# Patient Record
Sex: MALE | Race: WHITE | NOT HISPANIC OR LATINO | URBAN - METROPOLITAN AREA
[De-identification: names, ages, dates, MRNs, and addresses within clinical notes are randomized per-mention and may not be internally consistent; named-entity substitution may affect disease eponyms.]

---
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
## 2025-01-16 ENCOUNTER — OFFICE VISIT (OUTPATIENT)
Dept: MEDICAL GROUP | Facility: MEDICAL CENTER | Age: 20
End: 2025-01-16
Payer: COMMERCIAL

## 2025-01-16 VITALS
DIASTOLIC BLOOD PRESSURE: 62 MMHG | WEIGHT: 150 LBS | HEART RATE: 107 BPM | TEMPERATURE: 97.3 F | OXYGEN SATURATION: 98 % | SYSTOLIC BLOOD PRESSURE: 124 MMHG | HEIGHT: 72 IN | BODY MASS INDEX: 20.32 KG/M2

## 2025-01-16 DIAGNOSIS — Z13.6 ENCOUNTER FOR LIPID SCREENING FOR CARDIOVASCULAR DISEASE: ICD-10-CM

## 2025-01-16 DIAGNOSIS — Z13.1 SCREENING FOR DIABETES MELLITUS: ICD-10-CM

## 2025-01-16 DIAGNOSIS — Z13.0 SCREENING FOR DEFICIENCY ANEMIA: ICD-10-CM

## 2025-01-16 DIAGNOSIS — R42 LIGHTHEADEDNESS: ICD-10-CM

## 2025-01-16 DIAGNOSIS — R25.1 TREMOR: ICD-10-CM

## 2025-01-16 DIAGNOSIS — Z13.220 ENCOUNTER FOR LIPID SCREENING FOR CARDIOVASCULAR DISEASE: ICD-10-CM

## 2025-01-16 DIAGNOSIS — Z13.29 THYROID DISORDER SCREENING: ICD-10-CM

## 2025-01-16 PROCEDURE — 3078F DIAST BP <80 MM HG: CPT | Performed by: FAMILY MEDICINE

## 2025-01-16 PROCEDURE — 3074F SYST BP LT 130 MM HG: CPT | Performed by: FAMILY MEDICINE

## 2025-01-16 PROCEDURE — 99214 OFFICE O/P EST MOD 30 MIN: CPT | Performed by: FAMILY MEDICINE

## 2025-01-17 NOTE — PROGRESS NOTES
Verbal consent was acquired by the patient to use [employer] ambient listening note generation during this visit:  Yes      Chief complaint::Diagnoses of Tremor, Lightheadedness, Screening for deficiency anemia, Screening for diabetes mellitus, Encounter for lipid screening for cardiovascular disease, and Thyroid disorder screening were pertinent to this visit.    Assessment and Plan:   The following treatment plan was discussed:     Assessment & Plan  1. Tremors -chronic, stable.  More action tremors during fine motor skills  - Advised to try propranolol  - Patient consents to medication regimen  - Reports cold extremity in left pinky; advised to monitor for color changes  - Discussed propranolol's potential circulation issues    2. Lightheadedness - Acute condition  - Discussed vasovagal response  - Ordered orthostatic blood pressures which came back positive as his heart rate did jump up 30 points to maintain his current BP and full blood panel (CBC, complete metabolic panel, GFR, A1c, cholesterol panel, thyroid screening)  - Advised to hydrate before tests    3. Nasal congestion -chronic, stable.  Difficulty in nasal breathing during physical activity  - Managed with occasional saline spray    4. Cold sensation in hands -chronic, stable.  Occasional cold sensations in fingers without discoloration  - Advised to use hand warmers  - Monitor for numbness or significant color changes    Follow-up as needed  Mark was seen today for tremors.    Diagnoses and all orders for this visit:    Tremor    Lightheadedness  -     Orthostatic Blood Pressure    Screening for deficiency anemia  -     CBC WITHOUT DIFFERENTIAL; Future    Screening for diabetes mellitus  -     Comp Metabolic Panel; Future  -     ESTIMATED GFR; Future  -     HEMOGLOBIN A1C; Future    Encounter for lipid screening for cardiovascular disease  -     Lipid Profile; Future  -     Lipoprotein (a); Future    Thyroid disorder screening  -     TSH+FREE  T4        Followup: Return if symptoms worsen or fail to improve.    I have placed Orthostatic BP orders.  The MA is preforming Orthostatic BP orders under the direction of Dr. Newman  Subjective/HPI:     HPI:    Mark Miller is a pleasant 20 y.o. male here for   Chief Complaint   Patient presents with    Tremors        History of Present Illness  The patient is a 20-year-old individual presenting with tremors, cold sensation in their finger, nasal congestion, and lightheadedness.    They have experienced intermittent tremors for several years, worsening over time, especially during fine motor tasks like using tweezers.    They report occasional cold sensation in one finger without discoloration or numbness.    They have nasal congestion, unrelieved by saline spray.    They experience weekly episodes of lightheadedness for the past 1-2 months, occurring while seated, not related to positional changes. Their blood sugar levels are normal. They do not consume excessive caffeine or energy drinks. They request routine blood work.    Current Medicines (including changes today)  Current Outpatient Medications   Medication Sig Dispense Refill    propranolol (INDERAL) 10 MG Tab Take 1 Tablet by mouth 3 times a day as needed (anxiety). 30 Tablet 3    finasteride (PROPECIA) 1 MG tablet Take 1 Tablet by mouth every day. 90 Tablet 3    busPIRone (BUSPAR) 15 MG tablet Take 1 Tablet by mouth 2 times a day. 180 Tablet 3    mirtazapine (REMERON) 15 MG Tab Take 1 Tablet by mouth every evening. 90 Tablet 3    benzoyl peroxide (BENZOYL PEROXIDE WASH) 5 % external liquid Apply 1 Application topically every day. 148 mL 5     No current facility-administered medications for this visit.     Past Medical/ Surgical History  He  has a past medical history of Anxiety and Scoliosis.  He  has a past surgical history that includes dental extraction(s).       Objective:   /62   Pulse (!) 107   Temp 36.3 °C (97.3 °F)   Ht 1.829 m  (6')   Wt 68 kg (150 lb)   SpO2 98%  Body mass index is 20.34 kg/m².    Physical Exam  Constitutional:       General: He is not in acute distress.     Appearance: He is not ill-appearing or toxic-appearing.   HENT:      Head: Normocephalic.      Right Ear: Tympanic membrane and external ear normal.      Left Ear: Tympanic membrane and external ear normal.      Nose: Nose normal. No rhinorrhea.      Mouth/Throat:      Mouth: Mucous membranes are moist.      Pharynx: Oropharynx is clear. No posterior oropharyngeal erythema.   Eyes:      General:         Right eye: No discharge.         Left eye: No discharge.      Conjunctiva/sclera: Conjunctivae normal.      Pupils: Pupils are equal, round, and reactive to light.   Cardiovascular:      Rate and Rhythm: Normal rate and regular rhythm.      Heart sounds: No murmur heard.  Pulmonary:      Effort: Pulmonary effort is normal. No respiratory distress.      Breath sounds: Normal breath sounds. No wheezing.   Abdominal:      General: Abdomen is flat.   Musculoskeletal:         General: No swelling or tenderness.      Cervical back: Normal range of motion and neck supple.      Right lower leg: No edema.      Left lower leg: No edema.   Skin:     General: Skin is warm.   Neurological:      General: No focal deficit present.      Mental Status: He is alert and oriented to person, place, and time. Mental status is at baseline.      Comments: Mild tremors noted to bilateral hands   Psychiatric:         Mood and Affect: Mood normal.          Results  01/16/2025 orthostatic blood pressure: Lying down for 5 minutes /60 heart rate 86, standing 1 minute 118/62 heart rate 110 denied any dizziness, standing for 3 minutes /60 heart rate 115    Please note that this dictation was created using voice recognition software. I have made every reasonable attempt to correct obvious errors, but I expect that there are errors of grammar and possibly content that I did not discover  before finalizing the note.

## 2025-01-27 ENCOUNTER — HOSPITAL ENCOUNTER (OUTPATIENT)
Dept: LAB | Facility: MEDICAL CENTER | Age: 20
End: 2025-01-27
Attending: FAMILY MEDICINE
Payer: COMMERCIAL

## 2025-01-27 DIAGNOSIS — Z13.1 SCREENING FOR DIABETES MELLITUS: ICD-10-CM

## 2025-01-27 DIAGNOSIS — Z13.6 ENCOUNTER FOR LIPID SCREENING FOR CARDIOVASCULAR DISEASE: ICD-10-CM

## 2025-01-27 DIAGNOSIS — Z13.0 SCREENING FOR DEFICIENCY ANEMIA: ICD-10-CM

## 2025-01-27 DIAGNOSIS — Z13.220 ENCOUNTER FOR LIPID SCREENING FOR CARDIOVASCULAR DISEASE: ICD-10-CM

## 2025-01-27 LAB
ALBUMIN SERPL BCP-MCNC: 4.7 G/DL (ref 3.2–4.9)
ALBUMIN/GLOB SERPL: 1.7 G/DL
ALP SERPL-CCNC: 97 U/L (ref 30–99)
ALT SERPL-CCNC: 15 U/L (ref 2–50)
ANION GAP SERPL CALC-SCNC: 14 MMOL/L (ref 7–16)
AST SERPL-CCNC: 40 U/L (ref 12–45)
BILIRUB SERPL-MCNC: 0.3 MG/DL (ref 0.1–1.5)
BUN SERPL-MCNC: 8 MG/DL (ref 8–22)
CALCIUM ALBUM COR SERPL-MCNC: 8.9 MG/DL (ref 8.5–10.5)
CALCIUM SERPL-MCNC: 9.5 MG/DL (ref 8.5–10.5)
CHLORIDE SERPL-SCNC: 103 MMOL/L (ref 96–112)
CHOLEST SERPL-MCNC: 130 MG/DL (ref 100–199)
CO2 SERPL-SCNC: 24 MMOL/L (ref 20–33)
CREAT SERPL-MCNC: 0.95 MG/DL (ref 0.5–1.4)
ERYTHROCYTE [DISTWIDTH] IN BLOOD BY AUTOMATED COUNT: 40.9 FL (ref 35.9–50)
EST. AVERAGE GLUCOSE BLD GHB EST-MCNC: 105 MG/DL
GFR SERPLBLD CREATININE-BSD FMLA CKD-EPI: 117 ML/MIN/1.73 M 2
GLOBULIN SER CALC-MCNC: 2.8 G/DL (ref 1.9–3.5)
GLUCOSE SERPL-MCNC: 84 MG/DL (ref 65–99)
HBA1C MFR BLD: 5.3 % (ref 4–5.6)
HCT VFR BLD AUTO: 48.4 % (ref 42–52)
HDLC SERPL-MCNC: 50 MG/DL
HGB BLD-MCNC: 16 G/DL (ref 14–18)
LDLC SERPL CALC-MCNC: 62 MG/DL
MCH RBC QN AUTO: 29.9 PG (ref 27–33)
MCHC RBC AUTO-ENTMCNC: 33.1 G/DL (ref 32.3–36.5)
MCV RBC AUTO: 90.5 FL (ref 81.4–97.8)
PLATELET # BLD AUTO: 278 K/UL (ref 164–446)
PMV BLD AUTO: 9.5 FL (ref 9–12.9)
POTASSIUM SERPL-SCNC: 3.8 MMOL/L (ref 3.6–5.5)
PROT SERPL-MCNC: 7.5 G/DL (ref 6–8.2)
RBC # BLD AUTO: 5.35 M/UL (ref 4.7–6.1)
SODIUM SERPL-SCNC: 141 MMOL/L (ref 135–145)
T4 FREE SERPL-MCNC: 1.34 NG/DL (ref 0.93–1.7)
TRIGL SERPL-MCNC: 91 MG/DL (ref 0–149)
TSH SERPL-ACNC: 1.77 UIU/ML (ref 0.35–5.5)
WBC # BLD AUTO: 7.3 K/UL (ref 4.8–10.8)

## 2025-01-27 PROCEDURE — 84443 ASSAY THYROID STIM HORMONE: CPT

## 2025-01-27 PROCEDURE — 83695 ASSAY OF LIPOPROTEIN(A): CPT

## 2025-01-27 PROCEDURE — 36415 COLL VENOUS BLD VENIPUNCTURE: CPT

## 2025-01-27 PROCEDURE — 83036 HEMOGLOBIN GLYCOSYLATED A1C: CPT

## 2025-01-27 PROCEDURE — 80061 LIPID PANEL: CPT

## 2025-01-27 PROCEDURE — 84439 ASSAY OF FREE THYROXINE: CPT

## 2025-01-27 PROCEDURE — 80053 COMPREHEN METABOLIC PANEL: CPT

## 2025-01-27 PROCEDURE — 85027 COMPLETE CBC AUTOMATED: CPT

## 2025-01-29 LAB — LPA SERPL-MCNC: <6 MG/DL
